# Patient Record
Sex: FEMALE | Race: WHITE
[De-identification: names, ages, dates, MRNs, and addresses within clinical notes are randomized per-mention and may not be internally consistent; named-entity substitution may affect disease eponyms.]

---

## 2023-02-28 ENCOUNTER — DASHBOARD ENCOUNTERS (OUTPATIENT)
Age: 38
End: 2023-02-28

## 2023-02-28 ENCOUNTER — WEB ENCOUNTER (OUTPATIENT)
Dept: URBAN - METROPOLITAN AREA CLINIC 70 | Facility: CLINIC | Age: 38
End: 2023-02-28

## 2023-02-28 ENCOUNTER — LAB OUTSIDE AN ENCOUNTER (OUTPATIENT)
Dept: URBAN - METROPOLITAN AREA CLINIC 70 | Facility: CLINIC | Age: 38
End: 2023-02-28

## 2023-02-28 ENCOUNTER — OFFICE VISIT (OUTPATIENT)
Dept: URBAN - METROPOLITAN AREA CLINIC 70 | Facility: CLINIC | Age: 38
End: 2023-02-28
Payer: COMMERCIAL

## 2023-02-28 VITALS
DIASTOLIC BLOOD PRESSURE: 95 MMHG | HEART RATE: 78 BPM | BODY MASS INDEX: 41.48 KG/M2 | TEMPERATURE: 98 F | SYSTOLIC BLOOD PRESSURE: 145 MMHG | HEIGHT: 65 IN | WEIGHT: 249 LBS

## 2023-02-28 DIAGNOSIS — Z12.11 COLON CANCER SCREENING: ICD-10-CM

## 2023-02-28 DIAGNOSIS — K57.92 ACUTE DIVERTICULITIS: ICD-10-CM

## 2023-02-28 PROBLEM — 735593008: Status: ACTIVE | Noted: 2023-02-28

## 2023-02-28 PROCEDURE — 99203 OFFICE O/P NEW LOW 30 MIN: CPT | Performed by: REGISTERED NURSE

## 2023-02-28 RX ORDER — CIPROFLOXACIN HYDROCHLORIDE 500 MG/1
TAKE ONE TABLET BY MOUTH EVERY MORNING AND EVERY EVENING FOR 7 DAYS TABLET, FILM COATED ORAL
Qty: 14 UNSPECIFIED | Refills: 0 | Status: ACTIVE | COMMUNITY

## 2023-02-28 RX ORDER — METRONIDAZOLE 500 MG/1
TAKE ONE TABLET BY MOUTH EVERY MORNING, EVERY EVENING, AND AT BEDTIME FOR 10 DAYS TABLET, FILM COATED ORAL
Qty: 30 UNSPECIFIED | Refills: 0 | Status: ACTIVE | COMMUNITY

## 2023-02-28 RX ORDER — CIPROFLOXACIN HYDROCHLORIDE 500 MG/1
TAKE ONE TABLET BY MOUTH EVERY MORNING AND EVERY EVENING FOR 7 DAYS TABLET, FILM COATED ORAL
OUTPATIENT

## 2023-02-28 RX ORDER — DICYCLOMINE HYDROCHLORIDE 20 MG/1
TAKE ONE TABLET BY MOUTH EVERY MORNING AND TAKE ONE TABLET BY MOUTH AT BEDTIME FOR 10 DAYS TABLET ORAL
Qty: 20 UNSPECIFIED | Refills: 0 | Status: ACTIVE | COMMUNITY

## 2023-02-28 RX ORDER — METRONIDAZOLE 500 MG/1
TAKE ONE TABLET BY MOUTH EVERY MORNING, EVERY EVENING, AND AT BEDTIME FOR 10 DAYS TABLET, FILM COATED ORAL
OUTPATIENT

## 2023-02-28 NOTE — HPI-TODAY'S VISIT:
Pt was admitted to [facility] on 1/29/23 with acute diverticulitis with microperforation. She was treated for several days with IV Abx and then discharged home on PO Cipro and Flagyl. Her pain completely resolved and she had no issues for a couple of weeks. She developed LLQ pain again last week and returned to the ED 2/22/23. Repeat CT scan showed acute diverticulitis with no evidence of abscess or microperforation. She was discharge home on PO Cipro and Flagyl. She is still taking the Abx. Her pain has completely resolved. She has never had a colonoscopy.

## 2023-03-01 ENCOUNTER — OFFICE VISIT (OUTPATIENT)
Dept: URBAN - METROPOLITAN AREA CLINIC 88 | Facility: CLINIC | Age: 38
End: 2023-03-01

## 2023-03-07 PROBLEM — 275978004 COLON CANCER SCREENING: Status: ACTIVE | Noted: 2023-03-07

## 2023-04-04 ENCOUNTER — OFFICE VISIT (OUTPATIENT)
Dept: URBAN - METROPOLITAN AREA SURGERY CENTER 24 | Facility: SURGERY CENTER | Age: 38
End: 2023-04-04

## 2023-04-04 ENCOUNTER — TELEPHONE ENCOUNTER (OUTPATIENT)
Dept: URBAN - METROPOLITAN AREA CLINIC 35 | Facility: CLINIC | Age: 38
End: 2023-04-04

## 2023-04-04 ENCOUNTER — CLAIMS CREATED FROM THE CLAIM WINDOW (OUTPATIENT)
Dept: URBAN - METROPOLITAN AREA SURGERY CENTER 24 | Facility: SURGERY CENTER | Age: 38
End: 2023-04-04
Payer: COMMERCIAL

## 2023-04-04 DIAGNOSIS — R93.3 ABN FINDINGS-GI TRACT: ICD-10-CM

## 2023-04-04 PROCEDURE — G8907 PT DOC NO EVENTS ON DISCHARG: HCPCS | Performed by: INTERNAL MEDICINE

## 2023-04-04 PROCEDURE — 43235 EGD DIAGNOSTIC BRUSH WASH: CPT | Performed by: INTERNAL MEDICINE

## 2023-04-04 RX ORDER — CIPROFLOXACIN HYDROCHLORIDE 500 MG/1
TAKE ONE TABLET BY MOUTH EVERY MORNING AND EVERY EVENING FOR 7 DAYS TABLET, FILM COATED ORAL
Status: ACTIVE | COMMUNITY

## 2023-04-04 RX ORDER — METRONIDAZOLE 500 MG/1
TAKE ONE TABLET BY MOUTH EVERY MORNING, EVERY EVENING, AND AT BEDTIME FOR 10 DAYS TABLET, FILM COATED ORAL
Status: ACTIVE | COMMUNITY

## 2023-04-04 RX ORDER — DICYCLOMINE HYDROCHLORIDE 20 MG/1
TAKE ONE TABLET BY MOUTH EVERY MORNING AND TAKE ONE TABLET BY MOUTH AT BEDTIME FOR 10 DAYS TABLET ORAL
Qty: 20 UNSPECIFIED | Refills: 0 | Status: ACTIVE | COMMUNITY

## 2025-07-15 ENCOUNTER — OFFICE VISIT (OUTPATIENT)
Dept: URBAN - METROPOLITAN AREA CLINIC 88 | Facility: CLINIC | Age: 40
End: 2025-07-15

## 2025-07-24 ENCOUNTER — OFFICE VISIT (OUTPATIENT)
Dept: URBAN - METROPOLITAN AREA CLINIC 88 | Facility: CLINIC | Age: 40
End: 2025-07-24
Payer: COMMERCIAL

## 2025-07-24 ENCOUNTER — LAB OUTSIDE AN ENCOUNTER (OUTPATIENT)
Dept: URBAN - METROPOLITAN AREA CLINIC 88 | Facility: CLINIC | Age: 40
End: 2025-07-24

## 2025-07-24 DIAGNOSIS — R63.4 UNEXPLAINED WEIGHT LOSS: ICD-10-CM

## 2025-07-24 PROCEDURE — 99213 OFFICE O/P EST LOW 20 MIN: CPT | Performed by: NURSE PRACTITIONER

## 2025-07-24 RX ORDER — LISINOPRIL 10 MG/1
TAKE ONE TABLET BY MOUTH ONE TIME DAILY TABLET ORAL
Qty: 30 UNSPECIFIED | Refills: 4 | Status: ACTIVE | COMMUNITY

## 2025-07-24 RX ORDER — METRONIDAZOLE 500 MG/1
TAKE ONE TABLET BY MOUTH EVERY MORNING, EVERY EVENING, AND AT BEDTIME FOR 10 DAYS TABLET, FILM COATED ORAL
Status: DISCONTINUED | COMMUNITY

## 2025-07-24 RX ORDER — DICYCLOMINE HYDROCHLORIDE 20 MG/1
TAKE ONE TABLET BY MOUTH EVERY MORNING AND TAKE ONE TABLET BY MOUTH AT BEDTIME FOR 10 DAYS TABLET ORAL
Qty: 20 UNSPECIFIED | Refills: 0 | Status: DISCONTINUED | COMMUNITY

## 2025-07-24 RX ORDER — HYDROCHLOROTHIAZIDE 12.5 MG/1
TAKE ONE TABLET BY MOUTH ONE TIME DAILY TABLET ORAL
Qty: 30 UNSPECIFIED | Refills: 2 | Status: ACTIVE | COMMUNITY

## 2025-07-24 RX ORDER — CIPROFLOXACIN HYDROCHLORIDE 500 MG/1
TAKE ONE TABLET BY MOUTH EVERY MORNING AND EVERY EVENING FOR 7 DAYS TABLET, FILM COATED ORAL
Status: DISCONTINUED | COMMUNITY

## 2025-07-24 NOTE — HPI-OTHER HISTORIES
-------------------------------------------------- Office note 02/28/2023 by NAYELI Weldon NP: Pt was admitted to [facility] on 1/29/23 with acute diverticulitis with microperforation. She was treated for several days with IV Abx and then discharged home on PO Cipro and Flagyl. Her pain completely resolved and she had no issues for a couple of weeks. She developed LLQ pain again last week and returned to the ED 2/22/23. Repeat CT scan showed acute diverticulitis with no evidence of abscess or microperforation. She was discharge home on PO Cipro and Flagyl. She is still taking the Abx. Her pain has completely resolved. She has never had a colonoscopy.

## 2025-07-24 NOTE — HPI-TODAY'S VISIT:
39 y.o. presents today for evaluation of unintentional weight loss. States her normal weight was 287 lbs 7 months ago and has observed a gradual decline in weight loss.  Total of 65 lbs. States she "Googled" her symptoms and is concerned about underlying malignancy. Denies use of GLP-1 medications, nausea, vomiting, abdominal pain, symptoms of reflux, changes in bowel habits, diarrhea, signs of juandice.   Eats about 1-2 complete meals a day with 1-2 snacks.  Voices intermittent episodes of early satiety. Underwent sigmoid resection in 2023 due to recurrent diverticulitis.   Last colonosocpy was in 2023 with left sided diverticulitis noted.

## 2025-07-25 ENCOUNTER — TELEPHONE ENCOUNTER (OUTPATIENT)
Dept: URBAN - METROPOLITAN AREA CLINIC 23 | Facility: CLINIC | Age: 40
End: 2025-07-25

## 2025-07-28 LAB
T4,FREE(DIRECT): 1.22
TSH: 1.45

## 2025-07-31 ENCOUNTER — TELEPHONE ENCOUNTER (OUTPATIENT)
Dept: URBAN - METROPOLITAN AREA CLINIC 88 | Facility: CLINIC | Age: 40
End: 2025-07-31

## 2025-08-28 ENCOUNTER — OFFICE VISIT (OUTPATIENT)
Dept: URBAN - METROPOLITAN AREA CLINIC 88 | Facility: CLINIC | Age: 40
End: 2025-08-28

## 2025-08-28 RX ORDER — HYDROCHLOROTHIAZIDE 12.5 MG/1
TAKE ONE TABLET BY MOUTH ONE TIME DAILY TABLET ORAL
Qty: 30 UNSPECIFIED | Refills: 2 | Status: ACTIVE | COMMUNITY

## 2025-08-28 RX ORDER — LISINOPRIL 10 MG/1
TAKE ONE TABLET BY MOUTH ONE TIME DAILY TABLET ORAL
Qty: 30 UNSPECIFIED | Refills: 4 | Status: ACTIVE | COMMUNITY